# Patient Record
Sex: FEMALE | Race: WHITE | Employment: STUDENT | URBAN - METROPOLITAN AREA
[De-identification: names, ages, dates, MRNs, and addresses within clinical notes are randomized per-mention and may not be internally consistent; named-entity substitution may affect disease eponyms.]

---
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
## 2019-02-19 ENCOUNTER — TELEPHONE (OUTPATIENT)
Dept: OTOLARYNGOLOGY | Facility: CLINIC | Age: 19
End: 2019-02-19

## 2019-02-19 RX ORDER — AZITHROMYCIN 250 MG/1
TABLET, FILM COATED ORAL
Qty: 1 PACKAGE | Refills: 0 | Status: SHIPPED | OUTPATIENT
Start: 2019-02-19 | End: 2019-06-13 | Stop reason: ALTCHOICE

## 2019-02-19 NOTE — TELEPHONE ENCOUNTER
Pts mother calling to find out if Dr will be sending a Rx to [facility] in [place]?  Mom states that the pharm closes at 6:00pm.

## 2019-02-19 NOTE — TELEPHONE ENCOUNTER
Dr Trang Carias please see note below per pt mother pt is having sinus infection ,no fever,patient took Z pack before and it helped her,please advise.

## 2019-02-19 NOTE — TELEPHONE ENCOUNTER
Pts mother states pt has a sinus infection, asking if [facility] could prescribe abx, pt unable to make appt due to being away at school. Pls advise thank you.

## 2019-06-06 ENCOUNTER — TELEPHONE (OUTPATIENT)
Dept: PEDIATRICS CLINIC | Facility: CLINIC | Age: 19
End: 2019-06-06

## 2019-06-13 ENCOUNTER — APPOINTMENT (OUTPATIENT)
Dept: LAB | Facility: HOSPITAL | Age: 19
End: 2019-06-13
Attending: INTERNAL MEDICINE
Payer: COMMERCIAL

## 2019-06-13 ENCOUNTER — OFFICE VISIT (OUTPATIENT)
Dept: INTERNAL MEDICINE CLINIC | Facility: CLINIC | Age: 19
End: 2019-06-13
Payer: COMMERCIAL

## 2019-06-13 VITALS
SYSTOLIC BLOOD PRESSURE: 114 MMHG | DIASTOLIC BLOOD PRESSURE: 72 MMHG | TEMPERATURE: 99 F | HEIGHT: 68.5 IN | WEIGHT: 167.88 LBS | BODY MASS INDEX: 25.15 KG/M2 | HEART RATE: 71 BPM

## 2019-06-13 DIAGNOSIS — Z11.1 SCREENING FOR TUBERCULOSIS: ICD-10-CM

## 2019-06-13 DIAGNOSIS — Z11.1 SCREENING FOR TUBERCULOSIS: Primary | ICD-10-CM

## 2019-06-13 DIAGNOSIS — Z02.1 PRE-EMPLOYMENT EXAMINATION: ICD-10-CM

## 2019-06-13 PROCEDURE — 86480 TB TEST CELL IMMUN MEASURE: CPT

## 2019-06-13 PROCEDURE — 99385 PREV VISIT NEW AGE 18-39: CPT | Performed by: INTERNAL MEDICINE

## 2019-06-13 PROCEDURE — 36415 COLL VENOUS BLD VENIPUNCTURE: CPT

## 2019-06-13 NOTE — PROGRESS NOTES
Loida Pozo is a 20year old female. Patient presents with:  Physical      HPI:     HPI  Patient is here for physical. She is a new patient  Overall doing any complaints.   She has past medical history of recurrent sinus infections and ear infection 114/72 (BP Location: Left arm, Patient Position: Sitting, Cuff Size: adult)   Pulse 71   Temp 98.5 °F (36.9 °C) (Oral)   Ht 5' 8.5\" (1.74 m)   Wt 167 lb 14.4 oz (76.2 kg)   BMI 25.16 kg/m²   Physical Exam   Constitutional: She is oriented to person, place

## 2020-02-11 ENCOUNTER — TELEPHONE (OUTPATIENT)
Dept: OTOLARYNGOLOGY | Facility: CLINIC | Age: 20
End: 2020-02-11

## 2020-02-11 RX ORDER — AZITHROMYCIN 250 MG/1
TABLET, FILM COATED ORAL
Qty: 1 PACKAGE | Refills: 0 | Status: SHIPPED | OUTPATIENT
Start: 2020-02-11

## 2020-02-11 NOTE — TELEPHONE ENCOUNTER
Patient stated having sinus infection congestion,pressure headache and facial pain,greenish discharges ,no fever, Dr Lisa Milian made aware and order for Earlie End ,Rx sent and informed pt,pt verbalized understanding.

## 2022-04-25 ENCOUNTER — TELEPHONE (OUTPATIENT)
Dept: PEDIATRICS CLINIC | Facility: CLINIC | Age: 22
End: 2022-04-25

## 2022-04-29 ENCOUNTER — TELEPHONE (OUTPATIENT)
Dept: PEDIATRICS CLINIC | Facility: CLINIC | Age: 22
End: 2022-04-29

## (undated) NOTE — Clinical Note
1/10/2017              [facility]        [id number]         Immunization History   Administered Date(s) Administered   • DTAP 08/12/1999, 08/03/2004   • DTP 10/12/1999, 12/17/1999, 02/06/2001   • HEP B/HIB

## (undated) NOTE — MR AVS SNAPSHOT
[place]  [address]  [phone]               Thank you for choosing us for your health care visit with Mahi Lamas.  Sudhakar Linder MD.  We are glad to serve you and happy to provide you with this summ [employer] Questions? Call [phone] for help. [employer] is NOT to be used for urgent needs. For medical emergencies, dial 911.                Visit [facility] online at  [email or web address]

## (undated) NOTE — MR AVS SNAPSHOT
[address] [place], [place] [id number] [phone]               Thank you for choosing us for your health care visit with Farhana Arellano MD.  We are glad to serve you and happy to provide you with this summar [employer] access allows you to view health information for your child from their recent   visit, view other health information and more. To sign up or find more information on getting   Proxy Access to your child’s [employer] go to [email or web address] family routines to help everyone lead healthier active lives.  Try:  o Eating breakfast everyday  o Eating low-fat dairy products like yogurt, milk, and cheese  o Regularly eating meals together as a family  o Limiting fast food, take out food, and eating o

## (undated) NOTE — Clinical Note
VACCINE ADMINISTRATION RECORD  PARENT / GUARDIAN APPROVAL  Date: 1/10/2017  Vaccine administered to: Mark Reyes     : 2000    MRN: [id number]    A copy of the appropriate Centers for Disease Control and Prevention Vaccine Information statem

## (undated) NOTE — LETTER
8/15/2017              [facility]  ( [birth year])        [address] [id number]         Immunization History   Administered Date(s) Administered   • DTAP 1999, 2004   • DTP 10/12/1999, 1999, 2001

## (undated) NOTE — MR AVS SNAPSHOT
[facility]  [facility], [address]  [phone]               Thank you for choosing us for your health care visit with Zahra Rocha.  Reynaldo Arnold MD.  We are glad to serve you and happy to provide you with this sum Caplet                   Caplet       6-11 lbs                 1.25 ml  12-17 lbs               2.5 ml  18-23 lbs               3.75 ml  24-35 lbs               5 ml 60-71 lbs                                                     2&1/2 tsp            72-95 lbs                                                     3 tsp                              3               1&1/2 tablets  96 lbs and over [email or web address]